# Patient Record
(demographics unavailable — no encounter records)

---

## 2025-03-17 NOTE — HISTORY OF PRESENT ILLNESS
[Mother] : mother [Yes] : Patient goes to dentist yearly [Toothpaste] : Primary Fluoride Source: Toothpaste [No] : Patient has not had sexual intercourse. [HIV Screening Declined] : HIV Screening Declined [Has ways to cope with stress] : has ways to cope with stress [Displays self-confidence] : displays self-confidence [With Teen] : teen [NO] : No [Normal] : normal [LMP: _____] : LMP: [unfilled] [Irregular menses] : no irregular menses [Heavy Bleeding] : no heavy bleeding [Painful Cramps] : no painful cramps [Has problems with sleep] : does not have problems with sleep [Gets depressed, anxious, or irritable/has mood swings] : does not get depressed, anxious, or irritable/has mood swings [Has thought about hurting self or considered suicide] : has not thought about hurting self or considered suicide [FreeTextEntry7] : 15 yr well child [FreeTextEntry1] : Patient is doing well - has no concerns or issues. Mom concerned about her sleep habits, stays up late, has a hard time falling asleep, takes melatonin prn No change in health status since last [facility] No chest pains or difficulty of breathing reported No reactions to previous vaccinations. Denies depression or psychiatric issues. No mental health issues, not in counseling. No suicidal ideations Appetite good, no issues No new allergies reported Not sexually active Denies tobacco, ETOH, drug use or vaping No tobacco exposure Sleeping well with good sleeping patterns No recent severe illness or injury No emergency room visits No trauma to the head /concussion. Current thgthrthathdtheth:th th1th1th No problems in school identified -  no ADD/ADHD concerns Activities: volleyball, sports PHQ9 and CRAFFT reviewed, no issues Coordination of Care reviewed, no issues Cardiac questionnaire reviewed, no issues Has been to the dentist within last 12 months

## 2025-03-17 NOTE — DISCUSSION/SUMMARY
[Normal Growth] : growth [Normal Development] : development  [No Elimination Concerns] : elimination [Continue Regimen] : feeding [No Skin Concerns] : skin [Normal Sleep Pattern] : sleep [None] : no medical problems [Anticipatory Guidance Given] : Anticipatory guidance addressed as per the history of present illness section [Physical Growth and Development] : physical growth and development [Social and Academic Competence] : social and academic competence [Emotional Well-Being] : emotional well-being [Risk Reduction] : risk reduction [Violence and Injury Prevention] : violence and injury prevention [No Vaccines] : no vaccines needed [No Medications] : ~He/She~ is not on any medications [Patient] : patient [Parent/Guardian] : Parent/Guardian [Full Activity without restrictions including Physical Education & Athletics] : Full Activity without restrictions including Physical Education & Athletics [FreeTextEntry1] : Continue balanced diet with all food groups. Brush teeth twice a day with toothbrush. Recommend visit to dentist. Maintain consistent daily routines and sleep schedule. Personal hygiene, puberty, and sexual health reviewed. Risky behaviors assessed. School discussed. Limit screen time to no more than 2 hours per day. Encourage physical activity. Cardiac questionnaire reviewed, NO issues. 5-2-1-0 questionnaire reviewed and I discussed components of 5-2-1-0 healthy living with patient and family.  Recommended 5 servings of fruits and vegetables a day, less than 2 hours of screen time per day, 1 hour of exercise per day and zero sugar sweetened beverages. Parent(s) have no issues or concerns. Discussed in the preferred language of English Return 1 year for routine well child check. Gardasil and Influenza vaccination not carried out due to parent refusal .  I spent adequate time to explain the pros and cons of giving and not giving the vaccines.  Vaccine info sheets were given to parent(s) for reference.  Parent(s) are fully aware of the risks and consequences of refusing to immunize the patient and accept them. Discussed getting enough sleep and rest Regular sleep time
